# Patient Record
Sex: MALE | Race: WHITE | NOT HISPANIC OR LATINO | Employment: FULL TIME | URBAN - METROPOLITAN AREA
[De-identification: names, ages, dates, MRNs, and addresses within clinical notes are randomized per-mention and may not be internally consistent; named-entity substitution may affect disease eponyms.]

---

## 2019-08-08 ENCOUNTER — OFFICE VISIT (OUTPATIENT)
Dept: INTERNAL MEDICINE | Facility: CLINIC | Age: 29
End: 2019-08-08
Attending: FAMILY MEDICINE
Payer: COMMERCIAL

## 2019-08-08 ENCOUNTER — CLINICAL SUPPORT (OUTPATIENT)
Dept: INTERNAL MEDICINE | Facility: CLINIC | Age: 29
End: 2019-08-08
Payer: COMMERCIAL

## 2019-08-08 VITALS
DIASTOLIC BLOOD PRESSURE: 82 MMHG | HEART RATE: 70 BPM | SYSTOLIC BLOOD PRESSURE: 120 MMHG | BODY MASS INDEX: 21.97 KG/M2 | WEIGHT: 156.94 LBS | TEMPERATURE: 98 F | OXYGEN SATURATION: 98 % | HEIGHT: 71 IN

## 2019-08-08 DIAGNOSIS — G40.319 GENERALIZED CONVULSIVE EPILEPSY WITH INTRACTABLE EPILEPSY: ICD-10-CM

## 2019-08-08 DIAGNOSIS — Z00.00 ROUTINE GENERAL MEDICAL EXAMINATION AT A HEALTH CARE FACILITY: Primary | ICD-10-CM

## 2019-08-08 DIAGNOSIS — Z00.00 ANNUAL PHYSICAL EXAM: Primary | ICD-10-CM

## 2019-08-08 LAB
ALBUMIN SERPL BCP-MCNC: 4.2 G/DL (ref 3.5–5.2)
ALP SERPL-CCNC: 41 U/L (ref 55–135)
ALT SERPL W/O P-5'-P-CCNC: 24 U/L (ref 10–44)
ANION GAP SERPL CALC-SCNC: 7 MMOL/L (ref 8–16)
AST SERPL-CCNC: 18 U/L (ref 10–40)
BILIRUB SERPL-MCNC: 0.9 MG/DL (ref 0.1–1)
BUN SERPL-MCNC: 14 MG/DL (ref 6–20)
CALCIUM SERPL-MCNC: 9.6 MG/DL (ref 8.7–10.5)
CHLORIDE SERPL-SCNC: 100 MMOL/L (ref 95–110)
CHOLEST SERPL-MCNC: 160 MG/DL (ref 120–199)
CHOLEST/HDLC SERPL: 3.1 {RATIO} (ref 2–5)
CO2 SERPL-SCNC: 27 MMOL/L (ref 23–29)
CREAT SERPL-MCNC: 0.8 MG/DL (ref 0.5–1.4)
ERYTHROCYTE [DISTWIDTH] IN BLOOD BY AUTOMATED COUNT: 11.4 % (ref 11.5–14.5)
EST. GFR  (AFRICAN AMERICAN): >60 ML/MIN/1.73 M^2
EST. GFR  (NON AFRICAN AMERICAN): >60 ML/MIN/1.73 M^2
ESTIMATED AVG GLUCOSE: 88 MG/DL (ref 68–131)
GLUCOSE SERPL-MCNC: 94 MG/DL (ref 70–110)
HBA1C MFR BLD HPLC: 4.7 % (ref 4–5.6)
HCT VFR BLD AUTO: 45.3 % (ref 40–54)
HDLC SERPL-MCNC: 52 MG/DL (ref 40–75)
HDLC SERPL: 32.5 % (ref 20–50)
HGB BLD-MCNC: 16 G/DL (ref 14–18)
LDLC SERPL CALC-MCNC: 91.6 MG/DL (ref 63–159)
MCH RBC QN AUTO: 32.4 PG (ref 27–31)
MCHC RBC AUTO-ENTMCNC: 35.3 G/DL (ref 32–36)
MCV RBC AUTO: 92 FL (ref 82–98)
NONHDLC SERPL-MCNC: 108 MG/DL
PLATELET # BLD AUTO: 193 K/UL (ref 150–350)
PMV BLD AUTO: 10.2 FL (ref 9.2–12.9)
POTASSIUM SERPL-SCNC: 4 MMOL/L (ref 3.5–5.1)
PROT SERPL-MCNC: 7.1 G/DL (ref 6–8.4)
RBC # BLD AUTO: 4.94 M/UL (ref 4.6–6.2)
SODIUM SERPL-SCNC: 134 MMOL/L (ref 136–145)
TRIGL SERPL-MCNC: 82 MG/DL (ref 30–150)
WBC # BLD AUTO: 5.46 K/UL (ref 3.9–12.7)

## 2019-08-08 PROCEDURE — 80053 COMPREHEN METABOLIC PANEL: CPT

## 2019-08-08 PROCEDURE — 85027 COMPLETE CBC AUTOMATED: CPT

## 2019-08-08 PROCEDURE — 99999 PR PBB SHADOW E&M-NEW PATIENT-LVL III: CPT | Mod: PBBFAC,,, | Performed by: FAMILY MEDICINE

## 2019-08-08 PROCEDURE — 99203 OFFICE O/P NEW LOW 30 MIN: CPT | Mod: PBBFAC | Performed by: FAMILY MEDICINE

## 2019-08-08 PROCEDURE — 99999 PR PBB SHADOW E&M-NEW PATIENT-LVL III: ICD-10-PCS | Mod: PBBFAC,,, | Performed by: FAMILY MEDICINE

## 2019-08-08 PROCEDURE — 99385 PREV VISIT NEW AGE 18-39: CPT | Mod: S$PBB,,, | Performed by: FAMILY MEDICINE

## 2019-08-08 PROCEDURE — 80061 LIPID PANEL: CPT

## 2019-08-08 PROCEDURE — 83036 HEMOGLOBIN GLYCOSYLATED A1C: CPT

## 2019-08-08 PROCEDURE — 99385 PR PREVENTIVE VISIT,NEW,18-39: ICD-10-PCS | Mod: S$PBB,,, | Performed by: FAMILY MEDICINE

## 2019-08-08 NOTE — PROGRESS NOTES
Subjective:       Patient ID: Miguel Farmer is a 29 y.o. male.    Chief Complaint: Executive Health    New patient to establish care and annual wellness check, physical exam.  P, S, Fm, Soc Hx's; Meds, allergies reviewed and reconciled.  Health maintenance file reviewed and addressed items due.    Review of Systems   Constitutional: Negative for chills, fatigue, fever and unexpected weight change.   HENT: Negative for congestion and trouble swallowing.    Eyes: Negative for redness and visual disturbance.   Respiratory: Negative for cough, chest tightness and shortness of breath.    Cardiovascular: Negative for chest pain, palpitations and leg swelling.   Gastrointestinal: Negative for abdominal pain and blood in stool.   Genitourinary: Negative for difficulty urinating and hematuria.   Musculoskeletal: Negative for arthralgias, back pain, gait problem, joint swelling, myalgias and neck pain.   Skin: Negative for color change and rash.   Neurological: Negative for tremors, speech difficulty, weakness, numbness and headaches.   Hematological: Negative for adenopathy. Does not bruise/bleed easily.   Psychiatric/Behavioral: Negative for behavioral problems, confusion and sleep disturbance. The patient is not nervous/anxious.        Objective:      Physical Exam   Constitutional: He appears well-developed and well-nourished.   HENT:   Head: Normocephalic.   Right Ear: Tympanic membrane, external ear and ear canal normal.   Left Ear: Tympanic membrane, external ear and ear canal normal.   Mouth/Throat: Oropharynx is clear and moist.   Eyes: Pupils are equal, round, and reactive to light.   Neck: Normal range of motion. Neck supple. Carotid bruit is not present. No thyromegaly present.   Cardiovascular: Normal rate, regular rhythm, normal heart sounds and intact distal pulses. Exam reveals no gallop and no friction rub.   No murmur heard.  Pulmonary/Chest: Effort normal and breath sounds normal.   Abdominal: Soft. He  exhibits no distension and no mass. There is no tenderness. There is no rebound and no guarding.   Musculoskeletal: Normal range of motion. He exhibits no edema or tenderness.   Lymphadenopathy:     He has no cervical adenopathy.   Neurological: He is alert. He has normal strength. He displays normal reflexes. No cranial nerve deficit or sensory deficit. Coordination and gait normal.   Skin: Skin is warm and dry.   Psychiatric: He has a normal mood and affect. His behavior is normal. Judgment and thought content normal.   Nursing note and vitals reviewed.      Assessment:       1. Annual physical exam    2. Generalized convulsive epilepsy with intractable epilepsy        Plan:     Medication List with Changes/Refills   Current Medications    DIVALPROEX (DEPAKOTE) 500 MG EC TABLET    Take 2 tablets (1,000 mg total) by mouth once daily.   Discontinued Medications    HYDROCODONE-ACETAMINOPHEN (NORCO) 5-325 MG PER TABLET        OXYCODONE-ACETAMINOPHEN (PERCOCET) 7.5-325 MG PER TABLET        PENICILLIN V POTASSIUM (VEETID) 500 MG TABLET        TEMAZEPAM (RESTORIL) 15 MG CAPSULE         Miguel was seen today for [facility].    Diagnoses and all orders for this visit:    Annual physical exam    Generalized convulsive epilepsy with intractable epilepsy      See meds, orders, follow up, routing and instructions sections of encounter.  This is a new patient for [facility], [facility].  He has no acute complaints.  He is overdue for followup with his   neurologist.  He has had no recent seizures.      DANA/CHANTELLE  dd: 08/08/2019 12:27:00 (CDT)  td: 08/09/2019 05:03:38 (CDT)  Doc ID   #[id number]  Job ID #[id number]    CC:

## 2019-12-11 ENCOUNTER — OFFICE VISIT (OUTPATIENT)
Dept: OPTOMETRY | Facility: CLINIC | Age: 29
End: 2019-12-11

## 2019-12-11 ENCOUNTER — OFFICE VISIT (OUTPATIENT)
Dept: OPTOMETRY | Facility: CLINIC | Age: 29
End: 2019-12-11
Payer: COMMERCIAL

## 2019-12-11 DIAGNOSIS — H52.223 REGULAR ASTIGMATISM OF BOTH EYES: ICD-10-CM

## 2019-12-11 DIAGNOSIS — H52.13 MYOPIA, BILATERAL: Primary | ICD-10-CM

## 2019-12-11 DIAGNOSIS — Z04.9 DISEASE RULED OUT AFTER EXAMINATION: ICD-10-CM

## 2019-12-11 DIAGNOSIS — Z46.0 FITTING AND ADJUSTMENT OF SPECTACLES AND CONTACT LENSES: Primary | ICD-10-CM

## 2019-12-11 PROCEDURE — 99999 PR PBB SHADOW E&M-EST. PATIENT-LVL I: CPT | Mod: PBBFAC,,, | Performed by: OPTOMETRIST

## 2019-12-11 PROCEDURE — 92015 DETERMINE REFRACTIVE STATE: CPT | Mod: S$GLB,,, | Performed by: OPTOMETRIST

## 2019-12-11 PROCEDURE — 92310 PR CONTACT LENS FITTING (NO CHANGE): ICD-10-PCS | Mod: CSM,,, | Performed by: OPTOMETRIST

## 2019-12-11 PROCEDURE — 92015 PR REFRACTION: ICD-10-PCS | Mod: S$GLB,,, | Performed by: OPTOMETRIST

## 2019-12-11 PROCEDURE — 99999 PR PBB SHADOW E&M-EST. PATIENT-LVL I: ICD-10-PCS | Mod: PBBFAC,,, | Performed by: OPTOMETRIST

## 2019-12-11 PROCEDURE — 92004 PR EYE EXAM, NEW PATIENT,COMPREHESV: ICD-10-PCS | Mod: S$GLB,,, | Performed by: OPTOMETRIST

## 2019-12-11 PROCEDURE — 92004 COMPRE OPH EXAM NEW PT 1/>: CPT | Mod: S$GLB,,, | Performed by: OPTOMETRIST

## 2019-12-11 PROCEDURE — 99999 PR PBB SHADOW E&M-EST. PATIENT-LVL II: ICD-10-PCS | Mod: PBBFAC,,, | Performed by: OPTOMETRIST

## 2019-12-11 PROCEDURE — 99999 PR PBB SHADOW E&M-EST. PATIENT-LVL II: CPT | Mod: PBBFAC,,, | Performed by: OPTOMETRIST

## 2019-12-11 PROCEDURE — 92310 CONTACT LENS FITTING OU: CPT | Mod: CSM,,, | Performed by: OPTOMETRIST

## 2019-12-11 NOTE — PROGRESS NOTES
HPI     Pt here for annual and cl fit  Last exam was 2 yr ago  Wears air optix aqua   Patient denies diplopia, headaches, flashes/floaters, pain, and   itching/burning/tearing.    Pt does not use any eye drops.  No problems with va    Last edited by Rachel Lopez on 12/11/2019  2:14 PM. (History)            Assessment /Plan     For exam results, see Encounter Report.    Myopia, bilateral    Regular astigmatism of both eyes    Disease ruled out after examination      Rx specs  CL fit today: dispensed trials of air optix OU  Change power  Remove nightly, replace monthly  Ok to order if happy with vision and comfort OU    Good overall ocular health, monitor yearly

## 2020-04-21 DIAGNOSIS — Z01.84 ANTIBODY RESPONSE EXAMINATION: ICD-10-CM

## 2020-04-29 ENCOUNTER — LAB VISIT (OUTPATIENT)
Dept: LAB | Facility: HOSPITAL | Age: 30
End: 2020-04-29
Attending: INTERNAL MEDICINE
Payer: COMMERCIAL

## 2020-04-29 DIAGNOSIS — Z01.84 ANTIBODY RESPONSE EXAMINATION: ICD-10-CM

## 2020-04-29 LAB — SARS-COV-2 IGG SERPLBLD QL IA.RAPID: NEGATIVE

## 2020-04-29 PROCEDURE — 36415 COLL VENOUS BLD VENIPUNCTURE: CPT

## 2020-04-29 PROCEDURE — 86769 SARS-COV-2 COVID-19 ANTIBODY: CPT

## 2020-12-04 ENCOUNTER — OFFICE VISIT (OUTPATIENT)
Dept: OPTOMETRY | Facility: CLINIC | Age: 30
End: 2020-12-04
Payer: COMMERCIAL

## 2020-12-04 DIAGNOSIS — Z04.9 DISEASE RULED OUT AFTER EXAMINATION: ICD-10-CM

## 2020-12-04 DIAGNOSIS — H52.13 MYOPIA, BILATERAL: Primary | ICD-10-CM

## 2020-12-04 DIAGNOSIS — H52.223 REGULAR ASTIGMATISM OF BOTH EYES: ICD-10-CM

## 2020-12-04 PROCEDURE — 99999 PR PBB SHADOW E&M-EST. PATIENT-LVL II: ICD-10-PCS | Mod: PBBFAC,,, | Performed by: OPTOMETRIST

## 2020-12-04 PROCEDURE — 99999 PR PBB SHADOW E&M-EST. PATIENT-LVL II: CPT | Mod: PBBFAC,,, | Performed by: OPTOMETRIST

## 2020-12-04 PROCEDURE — 92015 DETERMINE REFRACTIVE STATE: CPT | Mod: S$GLB,,, | Performed by: OPTOMETRIST

## 2020-12-04 PROCEDURE — 92015 PR REFRACTION: ICD-10-PCS | Mod: S$GLB,,, | Performed by: OPTOMETRIST

## 2020-12-04 PROCEDURE — 92014 COMPRE OPH EXAM EST PT 1/>: CPT | Mod: S$GLB,,, | Performed by: OPTOMETRIST

## 2020-12-04 PROCEDURE — 92014 PR EYE EXAM, EST PATIENT,COMPREHESV: ICD-10-PCS | Mod: S$GLB,,, | Performed by: OPTOMETRIST

## 2020-12-04 NOTE — PROGRESS NOTES
HPI     Pt here for annual visit  No c/o about va  Happy with current specs  Feels like va with cls is not as good , but does not wish to pay for cl   fitting  Patient denies diplopia, headaches, flashes/floaters, pain, and   itching/burning/tearing.    Pt does not use any eye drops.      Last edited by Cyndee San, OD on 12/4/2020  1:40 PM. (History)            Assessment /Plan     For exam results, see Encounter Report.    Myopia, bilateral    Regular astigmatism of both eyes    Disease ruled out after examination    Rx specs  Good overall ocular health, monitor yearly    RTC 1 year, sooner PRN

## 2020-12-07 ENCOUNTER — LAB VISIT (OUTPATIENT)
Dept: URGENT CARE | Facility: CLINIC | Age: 30
End: 2020-12-07
Payer: COMMERCIAL

## 2020-12-07 ENCOUNTER — TELEPHONE (OUTPATIENT)
Dept: PRIMARY CARE CLINIC | Facility: OTHER | Age: 30
End: 2020-12-07

## 2020-12-07 DIAGNOSIS — R53.83 FATIGUE, UNSPECIFIED TYPE: ICD-10-CM

## 2020-12-07 DIAGNOSIS — J02.9 SORE THROAT: ICD-10-CM

## 2020-12-07 DIAGNOSIS — R68.83 CHILLS: ICD-10-CM

## 2020-12-07 DIAGNOSIS — R09.81 NASAL CONGESTION: Primary | ICD-10-CM

## 2020-12-07 DIAGNOSIS — U07.1 COVID-19 VIRUS DETECTED: ICD-10-CM

## 2020-12-07 DIAGNOSIS — R09.81 NASAL CONGESTION: ICD-10-CM

## 2020-12-07 LAB
CTP QC/QA: YES
SARS-COV-2 RDRP RESP QL NAA+PROBE: POSITIVE

## 2020-12-07 PROCEDURE — U0002 COVID-19 LAB TEST NON-CDC: HCPCS | Mod: QW,S$GLB,, | Performed by: INTERNAL MEDICINE

## 2020-12-07 PROCEDURE — U0002: ICD-10-PCS | Mod: QW,S$GLB,, | Performed by: INTERNAL MEDICINE

## 2020-12-07 NOTE — TELEPHONE ENCOUNTER
Called to discuss positive COVID-19 result from today, 12/7/2020 - patient states he was called by place he tested and told her was negative.  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 Voiced understanding of POSITIVE result, I will call him back shortly to review quarantine and provide with Trekea/Nomi contact info.

## 2020-12-07 NOTE — PATIENT INSTRUCTIONS
You have tested positive for COVID-19 today.  Please note that patients who test positive for COVID-19 are required by the CDC to undergo isolation for 10 days after their symptoms first began.  This isolation starts from the day you first developed symptoms, not the day of your positive test. For example, if your symptoms began on a Monday but tested positive on the following Wednesday, your 10-day isolation begins from that Monday, not the Wednesday you tested positive.  However, if you are asymptomatic (a person who does not have any symptoms) and COVID-19 positive, your 10-day isolation begins on the day you tested positive, regardless of exposure date.  Also, per the CDC guidelines, once your 10 days have passed, and you have not had fever greater than 100.4F in the last 24 hours without taking any fever reducers such as Tylenol (Acetaminophen) or Motrin (Ibuprofen), you may return to your normal activities including social distancing, wearing masks, and frequent handwashing - YOU DO NOT NEED ANOTHER TEST IN ORDER TO END YOUR QUARANTINE.

## 2020-12-07 NOTE — TELEPHONE ENCOUNTER
Call #2 Spoke to patient, symptom onset 12/4/2020. Unknown exposure. All questions were answered.

## 2021-03-05 ENCOUNTER — IMMUNIZATION (OUTPATIENT)
Dept: INTERNAL MEDICINE | Facility: CLINIC | Age: 31
End: 2021-03-05
Payer: COMMERCIAL

## 2021-03-05 DIAGNOSIS — Z23 NEED FOR VACCINATION: Primary | ICD-10-CM

## 2021-03-05 PROCEDURE — 91300 COVID-19, MRNA, LNP-S, PF, 30 MCG/0.3 ML DOSE VACCINE: CPT | Mod: PBBFAC | Performed by: INTERNAL MEDICINE

## 2021-03-26 ENCOUNTER — IMMUNIZATION (OUTPATIENT)
Dept: INTERNAL MEDICINE | Facility: CLINIC | Age: 31
End: 2021-03-26
Payer: COMMERCIAL

## 2021-03-26 DIAGNOSIS — Z23 NEED FOR VACCINATION: Primary | ICD-10-CM

## 2021-03-26 PROCEDURE — 0002A COVID-19, MRNA, LNP-S, PF, 30 MCG/0.3 ML DOSE VACCINE: CPT | Mod: PBBFAC | Performed by: INTERNAL MEDICINE

## 2021-03-26 PROCEDURE — 91300 COVID-19, MRNA, LNP-S, PF, 30 MCG/0.3 ML DOSE VACCINE: CPT | Mod: PBBFAC | Performed by: INTERNAL MEDICINE

## 2022-03-29 ENCOUNTER — PATIENT MESSAGE (OUTPATIENT)
Dept: RESEARCH | Facility: HOSPITAL | Age: 32
End: 2022-03-29
Payer: COMMERCIAL

## 2022-11-01 ENCOUNTER — IMMUNIZATION (OUTPATIENT)
Dept: PHARMACY | Facility: CLINIC | Age: 32
End: 2022-11-01
Payer: COMMERCIAL

## 2023-05-11 ENCOUNTER — OFFICE VISIT (OUTPATIENT)
Dept: PRIMARY CARE CLINIC | Facility: CLINIC | Age: 33
End: 2023-05-11
Payer: COMMERCIAL

## 2023-05-11 VITALS
HEART RATE: 69 BPM | RESPIRATION RATE: 18 BRPM | DIASTOLIC BLOOD PRESSURE: 70 MMHG | SYSTOLIC BLOOD PRESSURE: 114 MMHG | WEIGHT: 140.63 LBS | OXYGEN SATURATION: 99 % | HEIGHT: 71 IN | TEMPERATURE: 98 F | BODY MASS INDEX: 19.69 KG/M2

## 2023-05-11 DIAGNOSIS — Z76.89 ENCOUNTER TO ESTABLISH CARE: Primary | ICD-10-CM

## 2023-05-11 DIAGNOSIS — Z11.3 ROUTINE SCREENING FOR STI (SEXUALLY TRANSMITTED INFECTION): ICD-10-CM

## 2023-05-11 DIAGNOSIS — Z11.4 ENCOUNTER FOR SCREENING FOR HIV: ICD-10-CM

## 2023-05-11 DIAGNOSIS — Z11.59 NEED FOR HEPATITIS C SCREENING TEST: ICD-10-CM

## 2023-05-11 PROCEDURE — 3074F PR MOST RECENT SYSTOLIC BLOOD PRESSURE < 130 MM HG: ICD-10-PCS | Mod: CPTII,S$GLB,, | Performed by: STUDENT IN AN ORGANIZED HEALTH CARE EDUCATION/TRAINING PROGRAM

## 2023-05-11 PROCEDURE — 99999 PR PBB SHADOW E&M-EST. PATIENT-LVL III: CPT | Mod: PBBFAC,,, | Performed by: STUDENT IN AN ORGANIZED HEALTH CARE EDUCATION/TRAINING PROGRAM

## 2023-05-11 PROCEDURE — 3078F PR MOST RECENT DIASTOLIC BLOOD PRESSURE < 80 MM HG: ICD-10-PCS | Mod: CPTII,S$GLB,, | Performed by: STUDENT IN AN ORGANIZED HEALTH CARE EDUCATION/TRAINING PROGRAM

## 2023-05-11 PROCEDURE — 1160F RVW MEDS BY RX/DR IN RCRD: CPT | Mod: CPTII,S$GLB,, | Performed by: STUDENT IN AN ORGANIZED HEALTH CARE EDUCATION/TRAINING PROGRAM

## 2023-05-11 PROCEDURE — 3008F PR BODY MASS INDEX (BMI) DOCUMENTED: ICD-10-PCS | Mod: CPTII,S$GLB,, | Performed by: STUDENT IN AN ORGANIZED HEALTH CARE EDUCATION/TRAINING PROGRAM

## 2023-05-11 PROCEDURE — 1160F PR REVIEW ALL MEDS BY PRESCRIBER/CLIN PHARMACIST DOCUMENTED: ICD-10-PCS | Mod: CPTII,S$GLB,, | Performed by: STUDENT IN AN ORGANIZED HEALTH CARE EDUCATION/TRAINING PROGRAM

## 2023-05-11 PROCEDURE — 3078F DIAST BP <80 MM HG: CPT | Mod: CPTII,S$GLB,, | Performed by: STUDENT IN AN ORGANIZED HEALTH CARE EDUCATION/TRAINING PROGRAM

## 2023-05-11 PROCEDURE — 99999 PR PBB SHADOW E&M-EST. PATIENT-LVL III: ICD-10-PCS | Mod: PBBFAC,,, | Performed by: STUDENT IN AN ORGANIZED HEALTH CARE EDUCATION/TRAINING PROGRAM

## 2023-05-11 PROCEDURE — 99214 PR OFFICE/OUTPT VISIT, EST, LEVL IV, 30-39 MIN: ICD-10-PCS | Mod: S$GLB,,, | Performed by: STUDENT IN AN ORGANIZED HEALTH CARE EDUCATION/TRAINING PROGRAM

## 2023-05-11 PROCEDURE — 3008F BODY MASS INDEX DOCD: CPT | Mod: CPTII,S$GLB,, | Performed by: STUDENT IN AN ORGANIZED HEALTH CARE EDUCATION/TRAINING PROGRAM

## 2023-05-11 PROCEDURE — 1159F MED LIST DOCD IN RCRD: CPT | Mod: CPTII,S$GLB,, | Performed by: STUDENT IN AN ORGANIZED HEALTH CARE EDUCATION/TRAINING PROGRAM

## 2023-05-11 PROCEDURE — 3074F SYST BP LT 130 MM HG: CPT | Mod: CPTII,S$GLB,, | Performed by: STUDENT IN AN ORGANIZED HEALTH CARE EDUCATION/TRAINING PROGRAM

## 2023-05-11 PROCEDURE — 99214 OFFICE O/P EST MOD 30 MIN: CPT | Mod: S$GLB,,, | Performed by: STUDENT IN AN ORGANIZED HEALTH CARE EDUCATION/TRAINING PROGRAM

## 2023-05-11 PROCEDURE — 1159F PR MEDICATION LIST DOCUMENTED IN MEDICAL RECORD: ICD-10-PCS | Mod: CPTII,S$GLB,, | Performed by: STUDENT IN AN ORGANIZED HEALTH CARE EDUCATION/TRAINING PROGRAM

## 2023-05-11 NOTE — PROGRESS NOTES
"Subjective:       Patient ID: Miguel Farmer is a 32 y.o. male.    Chief Complaint: Establish Care      HPI:  32 y.o. male presents to [facility] to establish care      Acute concerns?: Patient reports unprotected sex 1 month ag o and would like STI screening at this time.    Taking Depakote hasn't seen Neurology in some time.    Last PCP?: None  Allergies:  NKDA  Medical History: Seizures  Medications: depakote 500 mg bid  Surgical History: None  Family History: No known cancers; no known autoimmune disease  Social History: Never smoker, EtOH on off week 1/2 days, no illicits    Hep C/HIV screening?: Amenable  Last tetanus vaccine?: Unknown, 2016      Review of Systems   Constitutional:  Negative for chills, diaphoresis, fatigue and fever.   HENT:  Negative for sinus pressure.    Respiratory:  Negative for chest tightness and shortness of breath.    Cardiovascular:  Negative for chest pain and palpitations.   Gastrointestinal:  Negative for abdominal pain, diarrhea, nausea and vomiting.   Genitourinary:  Negative for genital sores, penile discharge and testicular pain.   Skin:  Negative for rash and wound.     Objective:      Vitals:    05/11/23 1124   BP: 114/70   BP Location: Left arm   Patient Position: Sitting   BP Method: Medium (Manual)   Pulse: 69   Resp: 18   Temp: 97.6 °F (36.4 °C)   TempSrc: Temporal   SpO2: 99%   Weight: 63.8 kg (140 lb 10.5 oz)   Height: 5' 11" (1.803 m)     Physical Exam  Vitals reviewed.   Constitutional:       General: He is not in acute distress.     Appearance: Normal appearance. He is not ill-appearing.   HENT:      Head: Normocephalic and atraumatic.   Eyes:      General:         Right eye: No discharge.         Left eye: No discharge.      Conjunctiva/sclera: Conjunctivae normal.   Cardiovascular:      Rate and Rhythm: Normal rate and regular rhythm.      Pulses: Normal pulses.      Heart sounds: No murmur heard.  Pulmonary:      Effort: Pulmonary effort is normal.      Breath " sounds: Normal breath sounds.   Abdominal:      Palpations: Abdomen is soft.      Tenderness: There is no abdominal tenderness.   Musculoskeletal:         General: No deformity.      Cervical back: Neck supple. No rigidity.   Lymphadenopathy:      Cervical: No cervical adenopathy.   Skin:     General: Skin is warm and dry.      Coloration: Skin is not jaundiced.   Neurological:      General: No focal deficit present.      Mental Status: He is alert and oriented to person, place, and time.   Psychiatric:         Mood and Affect: Mood normal.         Behavior: Behavior normal.           Lab Results   Component Value Date     (L) 08/08/2019    K 4.0 08/08/2019     08/08/2019    CO2 27 08/08/2019    BUN 14 08/08/2019    CREATININE 0.8 08/08/2019    ANIONGAP 7 (L) 08/08/2019     Lab Results   Component Value Date    HGBA1C 4.7 08/08/2019     No results found for: BNP, BNPTRIAGEBLO    Lab Results   Component Value Date    WBC 5.46 08/08/2019    HGB 16.0 08/08/2019    HCT 45.3 08/08/2019     08/08/2019     Lab Results   Component Value Date    CHOL 160 08/08/2019    HDL 52 08/08/2019    LDLCALC 91.6 08/08/2019    TRIG 82 08/08/2019          Current Outpatient Medications:     divalproex ER (DEPAKOTE) 500 MG Tb24, Take 1 tablet by mouth twice daily, Disp: 60 tablet, Rfl: 5        Assessment:       1. Encounter to establish care    2. Routine screening for STI (sexually transmitted infection)    3. Need for hepatitis C screening test    4. Encounter for screening for HIV           Plan:       Encounter to establish care    Routine screening for STI (sexually transmitted infection)  -     RPR; Future; Expected date: 05/11/2023  -     HIV 1/2 Ag/Ab (4th Gen); Future; Expected date: 05/11/2023  -     Hepatitis B Surface Ab, Qualitative; Future; Expected date: 05/11/2023  -     Hepatitis C Antibody; Future; Expected date: 05/11/2023  -     C. trachomatis/N. gonorrhoeae by AMP DNA [facility]; Urine; Future;  Expected date: 05/11/2023  - No known exposure or outward Sx at this time    Need for hepatitis C screening test  -     Hepatitis C Antibody; Future; Expected date: 05/11/2023    Encounter for screening for HIV  -     HIV 1/2 Ag/Ab (4th Gen); Future; Expected date: 05/11/2023    RTC in 1 year

## 2023-09-18 ENCOUNTER — PATIENT MESSAGE (OUTPATIENT)
Dept: PRIMARY CARE CLINIC | Facility: CLINIC | Age: 33
End: 2023-09-18
Payer: COMMERCIAL

## 2023-10-18 ENCOUNTER — PATIENT MESSAGE (OUTPATIENT)
Dept: CARDIOLOGY | Facility: CLINIC | Age: 33
End: 2023-10-18
Payer: COMMERCIAL

## 2024-09-19 ENCOUNTER — PATIENT MESSAGE (OUTPATIENT)
Dept: PRIMARY CARE CLINIC | Facility: CLINIC | Age: 34
End: 2024-09-19
Payer: COMMERCIAL

## 2025-02-05 ENCOUNTER — OFFICE VISIT (OUTPATIENT)
Dept: URGENT CARE | Facility: CLINIC | Age: 35
End: 2025-02-05
Payer: COMMERCIAL

## 2025-02-05 VITALS
BODY MASS INDEX: 26.67 KG/M2 | TEMPERATURE: 98 F | HEART RATE: 89 BPM | DIASTOLIC BLOOD PRESSURE: 68 MMHG | RESPIRATION RATE: 17 BRPM | HEIGHT: 71 IN | WEIGHT: 190.5 LBS | OXYGEN SATURATION: 98 % | SYSTOLIC BLOOD PRESSURE: 106 MMHG

## 2025-02-05 DIAGNOSIS — M54.6 ACUTE MIDLINE THORACIC BACK PAIN: Primary | ICD-10-CM

## 2025-02-05 PROCEDURE — 71100 X-RAY EXAM RIBS UNI 2 VIEWS: CPT | Mod: LT,S$GLB,, | Performed by: RADIOLOGY

## 2025-02-05 PROCEDURE — 99203 OFFICE O/P NEW LOW 30 MIN: CPT | Mod: S$GLB,,,

## 2025-02-05 PROCEDURE — 72070 X-RAY EXAM THORAC SPINE 2VWS: CPT | Mod: S$GLB,,, | Performed by: RADIOLOGY

## 2025-02-05 NOTE — PROGRESS NOTES
"Subjective:      Patient ID: Miguel Farmer is a 34 y.o. male.    Vitals:  height is 5' 11" (1.803 m) and weight is 86.4 kg (190 lb 7.6 oz). His oral temperature is 97.6 °F (36.4 °C). His blood pressure is 106/68 and his pulse is 89. His respiration is 17 and oxygen saturation is 98%.     Chief Complaint: Back Pain    Pt presents today with mid back pain ; onset approx 4x days ago. Patient reports he has epilepsy and suspects he had a seizure  4x days ago and has no recall of the episode- was at home. States hasn't had an episode occur in seven years Patient complaining pain in back when changing position and has bite marks on tongue. denies loss of sensation , vision change , chest pain , sob , edema, blood loss and emesis. Pt tried tylenol and ibuprofen;mild relief.     Back Pain  This is a new problem. The current episode started in the past 7 days. The problem occurs constantly. The problem is unchanged. The pain is present in the costovertebral angle. The quality of the pain is described as aching and shooting. The pain does not radiate. The pain is at a severity of 7/10. The pain is moderate. The pain is The same all the time. The symptoms are aggravated by bending, position, lying down, sitting and standing. Stiffness is present All day. Pertinent negatives include no abdominal pain, bladder incontinence, bowel incontinence, chest pain, dysuria, fever, headaches, leg pain, numbness, paresis, paresthesias, pelvic pain, perianal numbness, tingling, weakness or weight loss. Treatments tried: tylenol and ibuprofen. The treatment provided mild relief.       Constitution: Negative for fever.   Cardiovascular:  Negative for chest pain.   Gastrointestinal:  Negative for abdominal pain and bowel incontinence.   Genitourinary:  Negative for dysuria, bladder incontinence and pelvic pain.   Musculoskeletal:  Positive for back pain.   Neurological:  Negative for headaches and numbness.      Objective:     Physical " Exam   Constitutional: He is oriented to person, place, and time.  Non-toxic appearance.   HENT:   Head: Normocephalic.   Ears:   Right Ear: External ear normal.   Left Ear: External ear normal.   Nose: Nose normal.   Mouth/Throat: Mucous membranes are moist.   Eyes: Conjunctivae are normal. Pupils are equal, round, and reactive to light.   Cardiovascular: Normal rate, regular rhythm and normal heart sounds.   Pulmonary/Chest: Breath sounds normal. No stridor. No respiratory distress. He has no wheezes. He has no rhonchi. He has no rales. He exhibits no tenderness.         Comments: Tenderness left ribs    Abdominal: Soft.   Musculoskeletal:      Thoracic back: He exhibits decreased range of motion, tenderness and bony tenderness.   Neurological: He is alert and oriented to person, place, and time.   Skin: Skin is warm, dry and not diaphoretic.   Psychiatric: His behavior is normal. Mood, judgment and thought content normal.     XR THORACIC SPINE AP LATERAL    Result Date: 2/5/2025  EXAMINATION: XR THORACIC SPINE AP LATERAL CLINICAL HISTORY: Pain in thoracic spine TECHNIQUE: AP and lateral views of the thoracic spine were performed. COMPARISON: Left rib views 14:16 hours same day FINDINGS: Cutaneous metal marker left posterior 10th intercostal space, T12-L1 level.  Partial rotation frontal view right, minor levoscoliosis mid dorsal spine, mild kyphosis upper dorsal spine, T4-T8 anterior wedge deformity most prominent at T6 moderate degree with remote appearance.  Minimal mild anterior spondylosis.     Remote appearing anterior wedge deformity T4-T8 vertebral bodies.  No subluxation or paraspinal soft tissue mass.  Additional findings above. This report was flagged in Epic as abnormal. Electronically signed by: Soto Diaz MD Date:    02/05/2025 Time:    15:51    X-Ray Ribs 2 View Left    Result Date: 2/5/2025  EXAMINATION: XR RIBS 2 VIEW LEFT CLINICAL HISTORY: Pain in thoracic spine TECHNIQUE: Two views of the  left ribs were performed. COMPARISON: None. FINDINGS: No definite acute rib fractures identified     See above Electronically signed by: Ken Barrett MD Date:    02/05/2025 Time:    14:46     Assessment:     1. Acute midline thoracic back pain        Plan:       Acute midline thoracic back pain  -     XR THORACIC SPINE AP LATERAL; Future; Expected date: 02/05/2025  -     X-Ray Ribs 2 View Left; Future; Expected date: 02/05/2025  -     Ambulatory referral/consult to [facility]  -     Ambulatory referral/consult to Orthopedics        Discussed injection in clinic and patient declined at this time. After results from xray images, patient able to make appointments to be seen tomorrow for follow up.      Patient Instructions   - You must understand that you have received an Urgent Care treatment only and that you may be released before all of your medical problems are known or treated.   - You, the patient, will arrange for follow up care as instructed.   - If your condition worsens or fails to improve we recommend that you receive another evaluation at the ER immediately or contact your PCP to discuss your concerns or return here.

## 2025-02-06 ENCOUNTER — OFFICE VISIT (OUTPATIENT)
Dept: NEUROSURGERY | Facility: CLINIC | Age: 35
End: 2025-02-06
Payer: COMMERCIAL

## 2025-02-06 VITALS
HEART RATE: 91 BPM | DIASTOLIC BLOOD PRESSURE: 83 MMHG | WEIGHT: 190.5 LBS | SYSTOLIC BLOOD PRESSURE: 143 MMHG | TEMPERATURE: 99 F | BODY MASS INDEX: 26.57 KG/M2

## 2025-02-06 DIAGNOSIS — M54.6 PAIN IN THORACIC SPINE: ICD-10-CM

## 2025-02-06 DIAGNOSIS — G40.319 GENERALIZED CONVULSIVE EPILEPSY WITH INTRACTABLE EPILEPSY: Primary | ICD-10-CM

## 2025-02-06 DIAGNOSIS — S22.000A COMPRESSION FRACTURE OF BODY OF THORACIC VERTEBRA: ICD-10-CM

## 2025-02-06 PROCEDURE — 3008F BODY MASS INDEX DOCD: CPT | Mod: CPTII,S$GLB,, | Performed by: NURSE PRACTITIONER

## 2025-02-06 PROCEDURE — 3079F DIAST BP 80-89 MM HG: CPT | Mod: CPTII,S$GLB,, | Performed by: NURSE PRACTITIONER

## 2025-02-06 PROCEDURE — 3077F SYST BP >= 140 MM HG: CPT | Mod: CPTII,S$GLB,, | Performed by: NURSE PRACTITIONER

## 2025-02-06 PROCEDURE — 1160F RVW MEDS BY RX/DR IN RCRD: CPT | Mod: CPTII,S$GLB,, | Performed by: NURSE PRACTITIONER

## 2025-02-06 PROCEDURE — 1159F MED LIST DOCD IN RCRD: CPT | Mod: CPTII,S$GLB,, | Performed by: NURSE PRACTITIONER

## 2025-02-06 PROCEDURE — 99999 PR PBB SHADOW E&M-EST. PATIENT-LVL IV: CPT | Mod: PBBFAC,,, | Performed by: NURSE PRACTITIONER

## 2025-02-06 PROCEDURE — 99204 OFFICE O/P NEW MOD 45 MIN: CPT | Mod: S$GLB,,, | Performed by: NURSE PRACTITIONER

## 2025-02-06 NOTE — PROGRESS NOTES
DATE: 2/12/2025  PATIENT: Miguel Farmer    Supervising Physician: Trung Aguilar M.D.    CHIEF COMPLAINT: compression fracture follow up    HISTORY:  Miguel Farmer is a 34 y.o. male  here for initial evaluation of mid back pain (Back - 3). The pain has been present for since 2/1/25. Pt has a hx of juvenile epilepsy and says his last seizure was 10 years ago. However, on 2/1/25 he suspects he had a seizure because he he had a memory lapse of a period of time and noticed back pain and bit marks on his tongue. He was seen in UC on 2/5/25 and xrays showed thoracic VCFs. Pt saw NSGY on 2/6/25 and they ordered an MRI. The patient describes the pain as stabbing.  The pain is worse with activity and improved by rest. There is negative associated numbness and tingling. There is negative subjective weakness. Prior treatments have included no previous back problems in the past, no PT, ESIs, surgery. He is currently wearing a TLSO. Pt says he has noticed that since the event his posture is different. Pt says the pain is tolerable but he is worried about long term consequences of the fractures    The patient denies myelopathic symptoms such as handwriting changes or difficulty with buttons/coins/keys. Denies perineal paresthesias, bowel/bladder dysfunction.    PAST MEDICAL/SURGICAL HISTORY:  Past Medical History:   Diagnosis Date    Seizures      No past surgical history on file.    Current Medications:   Current Outpatient Medications:     divalproex ER (DEPAKOTE ER) 500 MG Tb24 24 hr tablet, Take 1 tablet by mouth twice daily, Disp: 180 tablet, Rfl: 3    Social History:   Social History     Socioeconomic History    Marital status: Single   Tobacco Use    Smoking status: Never    Smokeless tobacco: Never   Substance and Sexual Activity    Alcohol use: Yes     Alcohol/week: 2.5 standard drinks of alcohol     Types: 3 drink(s) per week       REVIEW OF SYSTEMS:  Constitution: Negative. Negative for chills, fever and night  sweats.   Cardiovascular: Negative for chest pain and syncope.   Respiratory: Negative for cough and shortness of breath.   Gastrointestinal: See HPI. Negative for nausea/vomiting. Negative for abdominal pain.  Genitourinary: See HPI. Negative for discoloration or dysuria.  Skin: Negative for dry skin, itching and rash.   Hematologic/Lymphatic: Negative for bleeding problem. Does not bruise/bleed easily.   Musculoskeletal: Negative for falls and muscle weakness.   Neurological: See HPI. No seizures.   Endocrine: Negative for polydipsia, polyphagia and polyuria.   Allergic/Immunologic: Negative for hives and persistent infections.    PHYSICAL EXAMINATION:    There were no vitals taken for this visit.    General: The patient is a very pleasant 34 y.o. male in no apparent distress, the patient is oriented to person, place and time.   Psych: Normal mood and affect  HEENT: Vision grossly intact, hearing intact to the spoken word.  Lungs: Respirations unlabored.  Gait: Normal station and gait, no difficulty with toe or heel walk.   Skin: Dorsal lumbar skin negative for rashes, lesions, hairy patches and surgical scars.  Range of motion: Lumbar range of motion is acceptable. There is mild midline thoracic tenderness to palpation.  Spinal Balance: Global saggital and coronal spinal balance acceptable, no significant for scoliosis and kyphosis.  Musculoskeletal: No pain with the range of motion of the bilateral hips. No trochanteric tenderness to palpation.  Vascular: Bilateral lower extremities warm and well perfused, Dorsalis pedis pulses 2+ bilaterally.  Neurological: Normal strength and tone in all major motor groups in the bilateral lower extremities. Normal sensation to light touch in the L2-S1 dermatomes bilaterally.  Deep tendon reflexes symmetric 2+ in the bilateral lower extremities.  Negative Babinski bilaterally.  Straight leg raise negative bilaterally.     IMAGING:   Today I personally reviewed AP, Lat scoli  films demonstrate anterior wedge deformities T4 through T8 vertebral segments.     MRI thoracic demonstrates multiple compression fractures of the thoracic spine with mild height loss at T3, T7, T8 and T9 and moderate height loss at T5 and T6. Mild marrow edema noted within all of the involved vertebral bodies suggesting acute to subacute time course. No retropulsion or spinal stenosis    DXA scan shows L1 to L4 vertebral bone mineral density is equal to 0.743 g/cm squared with a T score of -3.2.       There is no height or weight on file to calculate BMI.    Hemoglobin A1C   Date Value Ref Range Status   08/08/2019 4.7 4.0 - 5.6 % Final     Comment:     ADA Screening Guidelines:  5.7-6.4%  Consistent with prediabetes  >or=6.5%  Consistent with diabetes  High levels of fetal hemoglobin interfere with the HbA1C  assay. Heterozygous hemoglobin variants (HbS, HgC, etc)do  not significantly interfere with this assay.   However, presence of multiple variants may affect accuracy.           ASSESSMENT/PLAN:    There are no diagnoses linked to this encounter.    Today we discussed at length all of the different treatment options including anti-inflammatories, acetaminophen, rest, ice, heat, physical therapy including strengthening and stretching exercises, home exercises, ROM, aerobic conditioning, aqua therapy, other modalities including ultrasound, massage, and dry needling, epidural steroid injections and finally surgical intervention.      Pt presents with acute thoracic VCFs without spinal stenosis. Reviewed images with Dr. Aguilar. Will treat conservatively with TLSO for comfort. Will start PT in a few weeks. Will refer to endocrine regarding osteoporosis. Will schedule repeat thoracic xrays in a month. Pt will fu sooner if pain worsens

## 2025-02-06 NOTE — PATIENT INSTRUCTIONS
- You must understand that you have received an Urgent Care treatment only and that you may be released before all of your medical problems are known or treated.   - You, the patient, will arrange for follow up care as instructed.   - If your condition worsens or fails to improve we recommend that you receive another evaluation at the ER immediately or contact your PCP to discuss your concerns or return here.

## 2025-02-06 NOTE — PROGRESS NOTES
"  Neurosurgery  History & Physical    SUBJECTIVE:       History of Present Illness: Miguel Farmer is a 34 y.o. male being seen in clinic today to follow-up on his recent urgent care visit from 2/5/25 where he was diagnosed T4-T8 anterior wedge deformity most prominent at T6 after sustaining what sounds to be a seizure. He has a hx of Juvenile myoclonic epilepsy. States that he has not had a seizure since 2011 and has not been taken his Depakote consistently as neurology felt like he had "grown out" of the seizure disorder given his normal EEGs. He does not clearly recall his recent episode as he was found on the couch with bite marks on tongue. Reports persistent upper back and abdominal distention since the episode. Describes the pain as constant and stabbing midline around T4-8.  Aggravating factors include standing, bending, and twisting. Alleviating factors include Tylenol and Advil. Denies weakness, numbness or tingling, b/b dysfunction, saddle anesthesia, or gait instability.      Review of patient's allergies indicates:  No Known Allergies    Current Outpatient Medications   Medication Sig Dispense Refill    divalproex 500 MG Tb24 Take 1 tablet (500 mg total) by mouth 2 (two) times daily. 180 tablet 5    divalproex ER (DEPAKOTE) 500 MG Tb24 Take 1 tablet by mouth twice daily 60 tablet 5     No current facility-administered medications for this visit.       Past Medical History:   Diagnosis Date    Seizures      No past surgical history on file.  Family History       Problem Relation (Age of Onset)    Cancer Maternal Grandmother    Diabetes Paternal Grandmother    Parkinsonism Maternal Grandfather    Seizures Cousin (7)          Social History     Socioeconomic History    Marital status: Single   Tobacco Use    Smoking status: Never    Smokeless tobacco: Never   Substance and Sexual Activity    Alcohol use: Yes     Alcohol/week: 2.5 standard drinks of alcohol     Types: 3 drink(s) per week       Review " of Systems    OBJECTIVE:     Vital Signs  Temp: 98.6 °F (37 °C)  Pulse: 91  BP: (!) 143/83  Pain Score: 0-No pain  Weight: 86.4 kg (190 lb 7.6 oz)  Body mass index is 26.57 kg/m².      Neurosurgery Physical Exam  General: well developed, well nourished, no distress.   Head: normocephalic, atraumatic  Neurologic: Alert and oriented. Thought content appropriate.  GCS: Motor: 6/Verbal: 5/Eyes: 4 GCS Total: 15  Mental Status: Awake, Alert, Oriented x 4  Language: No aphasia  Speech: No dysarthria  Cranial nerves: face symmetric, tongue midline, CN II-XII grossly intact.   Eyes: pupils equal, round  Pulmonary: normal respirations, no signs of respiratory distress  Abdomen: soft, non-distended  Skin: Skin is warm, dry and intact.  Sensory: intact to light touch throughout  Motor Strength:Moves all extremities spontaneously with good tone.  Full strength upper and lower extremities. No abnormal movements seen.     Reflexes:   Moise's: Negative.     Cerebellar:   Gait stable, fluid.   Tandem Gait: No difficulty  Able to walk on heels & toes     Thoracic:  Midline TTP: Positive around T4-8    Diagnostic Results:  I have personally reviewed the x-ray thoracic spine dated 2/5/25 as mentioned in the HPI.     ASSESSMENT/PLAN:     Miguel Farmer is a 34 y.o. male seen in clinic today to follow-up on his recent urgent care visit from 2/5/25 where he was diagnosed T4-T8 anterior wedge deformity most prominent at T6 after sustaining what sounds to be a seizure. He has a hx of Juvenile myoclonic epilepsy. He does not clearly recall his recent episode as he was found on the couch with bite marks on tongue. I have ordered:    - MRI thoracic spine to evaluate the chronicity of the fractures and for any stenosis.   -TLSO brace when OOB  - Referral to neurology to establish for epilepsy  - Scoliosis x-ray   - Repeat x-ray thoracic xray in 6 weeks  - Consider CT thoracic based on MRI findings    I would like the patient to follow-up  in clinic in 6 weeks. I have encouraged him to contact the clinic with any questions, concerns, or adverse clinical changes. He verbalized understanding.      JEANNETTE Blue-PIETER  Neurosurgery  [facility] [place].      Note dictated with voice recognition software, please excuse any grammatical errors.

## 2025-02-07 ENCOUNTER — TELEPHONE (OUTPATIENT)
Dept: NEUROLOGY | Facility: CLINIC | Age: 35
End: 2025-02-07
Payer: COMMERCIAL

## 2025-02-07 ENCOUNTER — HOSPITAL ENCOUNTER (OUTPATIENT)
Dept: RADIOLOGY | Facility: HOSPITAL | Age: 35
Discharge: HOME | End: 2025-02-07
Attending: NURSE PRACTITIONER
Payer: COMMERCIAL

## 2025-02-07 DIAGNOSIS — S22.000A COMPRESSION FRACTURE OF BODY OF THORACIC VERTEBRA: ICD-10-CM

## 2025-02-07 DIAGNOSIS — M54.6 PAIN IN THORACIC SPINE: ICD-10-CM

## 2025-02-07 PROCEDURE — 72082 X-RAY EXAM ENTIRE SPI 2/3 VW: CPT | Mod: 26,,, | Performed by: RADIOLOGY

## 2025-02-07 PROCEDURE — 72082 X-RAY EXAM ENTIRE SPI 2/3 VW: CPT | Mod: TC

## 2025-02-07 NOTE — TELEPHONE ENCOUNTER
Spoke with the patient. Offered next available appointment 2/17 at 8:20 am. Pt declined next available. 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 He is off now until 2/13 if we can manage something next week before Thursday. Will f/u with team and return pt's call to schedule.

## 2025-02-07 NOTE — TELEPHONE ENCOUNTER
"----- Message from Willie Hampton MD sent at 2/7/2025 10:56 AM CST -----  Regarding: RE: Epilepsy Appointment  Kristel,  We can add him on next week.  CV  ----- Message -----  From: Eli Wisdom RN  Sent: 2/7/2025   9:27 AM CST  To: Aniyah Jacobs NP; Willie Hampton MD; #  Subject: RE: Epilepsy Appointment                         Good morning, Dr. Hampton,    Can we fit this [facility] physician in with you sometime soon?    (I know the dept is already struggling and next week is Dr. BELL's last week before leave.)    Thanks,   Eli  ----- Message -----  From: Aniyah Jacobs NP  Sent: 2/6/2025   2:47 PM CST  To: Eli Wisdom RN  Subject: Epilepsy Appointment                             Good Afternoon,    I saw this sweet [facility] medicine physician who had a seizure 2 days ago. He has a hx of juvenile seizures and was told he "outgrew" them and no longer needed his medication. He has since restarted his medication but needs to establish with [facility]. Can you please help with an appointment?    Thank you,  Aniyah"

## 2025-02-10 ENCOUNTER — TELEPHONE (OUTPATIENT)
Dept: NEUROLOGY | Facility: CLINIC | Age: 35
End: 2025-02-10
Payer: COMMERCIAL

## 2025-02-10 NOTE — TELEPHONE ENCOUNTER
Provider has graciously agreed to add pt to clinic schedule on a day when he is on hospital service.   Spoke with the patient. Offered next available appointment tomorrow or Wednesday at 1. Pt accepted next available 2/11/25 at 1 pm with Dr Willie Hampton, verbalized understanding, and repeated back date/time/location of scheduled appointment.

## 2025-02-11 ENCOUNTER — HOSPITAL ENCOUNTER (OUTPATIENT)
Dept: RADIOLOGY | Facility: OTHER | Age: 35
Discharge: HOME | End: 2025-02-11
Attending: NURSE PRACTITIONER
Payer: COMMERCIAL

## 2025-02-11 ENCOUNTER — OFFICE VISIT (OUTPATIENT)
Dept: NEUROLOGY | Facility: CLINIC | Age: 35
End: 2025-02-11
Payer: COMMERCIAL

## 2025-02-11 VITALS — BODY MASS INDEX: 20.97 KG/M2 | HEIGHT: 71 IN | WEIGHT: 149.81 LBS

## 2025-02-11 DIAGNOSIS — R56.9 SEIZURE: Primary | ICD-10-CM

## 2025-02-11 DIAGNOSIS — M54.6 PAIN IN THORACIC SPINE: ICD-10-CM

## 2025-02-11 DIAGNOSIS — S22.000A COMPRESSION FRACTURE OF BODY OF THORACIC VERTEBRA: ICD-10-CM

## 2025-02-11 PROCEDURE — 99205 OFFICE O/P NEW HI 60 MIN: CPT | Mod: S$GLB,,, | Performed by: PSYCHIATRY & NEUROLOGY

## 2025-02-11 PROCEDURE — 99999 PR PBB SHADOW E&M-EST. PATIENT-LVL III: CPT | Mod: PBBFAC,,, | Performed by: PSYCHIATRY & NEUROLOGY

## 2025-02-11 PROCEDURE — 1159F MED LIST DOCD IN RCRD: CPT | Mod: CPTII,S$GLB,, | Performed by: PSYCHIATRY & NEUROLOGY

## 2025-02-11 PROCEDURE — 3008F BODY MASS INDEX DOCD: CPT | Mod: CPTII,S$GLB,, | Performed by: PSYCHIATRY & NEUROLOGY

## 2025-02-11 PROCEDURE — 72146 MRI CHEST SPINE W/O DYE: CPT | Mod: TC

## 2025-02-11 PROCEDURE — 1160F RVW MEDS BY RX/DR IN RCRD: CPT | Mod: CPTII,S$GLB,, | Performed by: PSYCHIATRY & NEUROLOGY

## 2025-02-11 PROCEDURE — 72146 MRI CHEST SPINE W/O DYE: CPT | Mod: 26,,, | Performed by: RADIOLOGY

## 2025-02-11 RX ORDER — DIVALPROEX SODIUM 500 MG/1
TABLET, FILM COATED, EXTENDED RELEASE ORAL
Qty: 180 TABLET | Refills: 3 | Status: SHIPPED | OUTPATIENT
Start: 2025-02-11
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
## 2025-02-12 ENCOUNTER — TELEPHONE (OUTPATIENT)
Dept: NEUROSURGERY | Facility: CLINIC | Age: 35
End: 2025-02-12
Payer: COMMERCIAL
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
## 2025-02-12 DIAGNOSIS — S22.000A COMPRESSION FRACTURE OF BODY OF THORACIC VERTEBRA: Primary | ICD-10-CM

## 2025-02-13 ENCOUNTER — OFFICE VISIT (OUTPATIENT)
Dept: ORTHOPEDICS | Facility: CLINIC | Age: 35
End: 2025-02-13
Payer: COMMERCIAL

## 2025-02-13 VITALS — HEIGHT: 71 IN | BODY MASS INDEX: 20.96 KG/M2 | WEIGHT: 149.69 LBS

## 2025-02-13 DIAGNOSIS — S22.000D COMPRESSION FRACTURE OF THORACIC VERTEBRA WITH ROUTINE HEALING, UNSPECIFIED THORACIC VERTEBRAL LEVEL, SUBSEQUENT ENCOUNTER: Primary | ICD-10-CM

## 2025-02-13 PROCEDURE — 3008F BODY MASS INDEX DOCD: CPT | Mod: CPTII,S$GLB,, | Performed by: ORTHOPAEDIC SURGERY

## 2025-02-13 PROCEDURE — 99999 PR PBB SHADOW E&M-EST. PATIENT-LVL III: CPT | Mod: PBBFAC,,, | Performed by: ORTHOPAEDIC SURGERY

## 2025-02-13 PROCEDURE — 1159F MED LIST DOCD IN RCRD: CPT | Mod: CPTII,S$GLB,, | Performed by: ORTHOPAEDIC SURGERY

## 2025-02-13 PROCEDURE — 99204 OFFICE O/P NEW MOD 45 MIN: CPT | Mod: S$GLB,,, | Performed by: ORTHOPAEDIC SURGERY

## 2025-02-14 ENCOUNTER — E-CONSULT (OUTPATIENT)
Dept: ENDOCRINOLOGY | Facility: CLINIC | Age: 35
End: 2025-02-14
Payer: COMMERCIAL

## 2025-02-14 ENCOUNTER — PATIENT MESSAGE (OUTPATIENT)
Dept: ORTHOPEDICS | Facility: CLINIC | Age: 35
End: 2025-02-14
Payer: COMMERCIAL

## 2025-02-14 ENCOUNTER — LAB VISIT (OUTPATIENT)
Dept: LAB | Facility: HOSPITAL | Age: 35
End: 2025-02-14
Attending: ORTHOPAEDIC SURGERY
Payer: COMMERCIAL

## 2025-02-14 DIAGNOSIS — M81.0 OSTEOPOROSIS, UNSPECIFIED OSTEOPOROSIS TYPE, UNSPECIFIED PATHOLOGICAL FRACTURE PRESENCE: Primary | ICD-10-CM

## 2025-02-14 DIAGNOSIS — S22.000D COMPRESSION FRACTURE OF THORACIC VERTEBRA WITH ROUTINE HEALING, UNSPECIFIED THORACIC VERTEBRAL LEVEL, SUBSEQUENT ENCOUNTER: ICD-10-CM

## 2025-02-14 LAB
ALBUMIN SERPL BCP-MCNC: 4.3 G/DL (ref 3.5–5.2)
ALP SERPL-CCNC: 137 U/L (ref 40–150)
ALT SERPL W/O P-5'-P-CCNC: 17 U/L (ref 10–44)
ANION GAP SERPL CALC-SCNC: 9 MMOL/L (ref 8–16)
AST SERPL-CCNC: 23 U/L (ref 10–40)
BASOPHILS # BLD AUTO: 0.03 K/UL (ref 0–0.2)
BASOPHILS NFR BLD: 0.5 % (ref 0–1.9)
BILIRUB SERPL-MCNC: 1.1 MG/DL (ref 0.1–1)
BUN SERPL-MCNC: 13 MG/DL (ref 6–20)
CALCIUM SERPL-MCNC: 9.8 MG/DL (ref 8.7–10.5)
CALCIUM SERPL-MCNC: 9.8 MG/DL (ref 8.7–10.5)
CHLORIDE SERPL-SCNC: 107 MMOL/L (ref 95–110)
CO2 SERPL-SCNC: 22 MMOL/L (ref 23–29)
CREAT SERPL-MCNC: 0.7 MG/DL (ref 0.5–1.4)
DIFFERENTIAL METHOD BLD: ABNORMAL
EOSINOPHIL # BLD AUTO: 0 K/UL (ref 0–0.5)
EOSINOPHIL NFR BLD: 0.7 % (ref 0–8)
ERYTHROCYTE [DISTWIDTH] IN BLOOD BY AUTOMATED COUNT: 11.6 % (ref 11.5–14.5)
EST. GFR  (NO RACE VARIABLE): >60 ML/MIN/1.73 M^2
GLUCOSE SERPL-MCNC: 91 MG/DL (ref 70–110)
HCT VFR BLD AUTO: 44.7 % (ref 40–54)
HGB BLD-MCNC: 15.4 G/DL (ref 14–18)
IMM GRANULOCYTES # BLD AUTO: 0.05 K/UL (ref 0–0.04)
IMM GRANULOCYTES NFR BLD AUTO: 0.9 % (ref 0–0.5)
LYMPHOCYTES # BLD AUTO: 2 K/UL (ref 1–4.8)
LYMPHOCYTES NFR BLD: 35.6 % (ref 18–48)
MCH RBC QN AUTO: 31.4 PG (ref 27–31)
MCHC RBC AUTO-ENTMCNC: 34.5 G/DL (ref 32–36)
MCV RBC AUTO: 91 FL (ref 82–98)
MONOCYTES # BLD AUTO: 0.5 K/UL (ref 0.3–1)
MONOCYTES NFR BLD: 9.1 % (ref 4–15)
NEUTROPHILS # BLD AUTO: 2.9 K/UL (ref 1.8–7.7)
NEUTROPHILS NFR BLD: 53.2 % (ref 38–73)
NRBC BLD-RTO: 0 /100 WBC
PLATELET # BLD AUTO: 248 K/UL (ref 150–450)
PMV BLD AUTO: 10.3 FL (ref 9.2–12.9)
POTASSIUM SERPL-SCNC: 4.2 MMOL/L (ref 3.5–5.1)
PROT SERPL-MCNC: 7.4 G/DL (ref 6–8.4)
PTH-INTACT SERPL-MCNC: 32.2 PG/ML (ref 9–77)
RBC # BLD AUTO: 4.91 M/UL (ref 4.6–6.2)
SODIUM SERPL-SCNC: 138 MMOL/L (ref 136–145)
TESTOST SERPL-MCNC: 600 NG/DL (ref 304–1227)
TSH SERPL DL<=0.005 MIU/L-ACNC: 1.23 UIU/ML (ref 0.4–4)
WBC # BLD AUTO: 5.47 K/UL (ref 3.9–12.7)

## 2025-02-14 PROCEDURE — 84075 ASSAY ALKALINE PHOSPHATASE: CPT | Performed by: ORTHOPAEDIC SURGERY

## 2025-02-14 PROCEDURE — 82652 VIT D 1 25-DIHYDROXY: CPT | Performed by: ORTHOPAEDIC SURGERY

## 2025-02-14 PROCEDURE — 85025 COMPLETE CBC W/AUTO DIFF WBC: CPT | Performed by: ORTHOPAEDIC SURGERY

## 2025-02-14 PROCEDURE — 84443 ASSAY THYROID STIM HORMONE: CPT | Performed by: ORTHOPAEDIC SURGERY

## 2025-02-14 PROCEDURE — 84403 ASSAY OF TOTAL TESTOSTERONE: CPT | Performed by: ORTHOPAEDIC SURGERY

## 2025-02-14 PROCEDURE — 80053 COMPREHEN METABOLIC PANEL: CPT | Performed by: ORTHOPAEDIC SURGERY

## 2025-02-14 PROCEDURE — 83970 ASSAY OF PARATHORMONE: CPT | Performed by: ORTHOPAEDIC SURGERY

## 2025-02-14 NOTE — CONSULTS
[facility]  Response for E-Consult     Patient Name: Miguel Farmer  MRN: [id number]  Primary Care Provider: Bon Coyle MD   Requesting Provider: Ivanna Lane.,*  E-Consult to Endocrinology  Consult performed by: Roddy Chavira DO  Consult ordered by: Ivanna Lane, PA-C  Reason for consult: Osteoporosis  Assessment/Recommendations: See note      I have reviewed the chart and recent bone density scan for this patient, which confirms osteoporosis with T-scores less than -3 in the lumbar spine and osteopenia at the hip. Given the recent thoracic compression fracture after a possible epileptic event, we recommend a secondary workup for osteoporosis to assess for underlying causes before therapy decision can be made.    Suggested testing includes:  TSH  PTH  Serum calcium  25-hydroxy vitamin D  Testosterone  24-hour urine calcium  CBC  CMP  Alkaline phosphatase    At this time, we recommend referral to endocrinology for further evaluation and treatment planning. No specific medical therapy is advised until the full assessment is completed.    Let us know if you have any questions.      Total time of Consultation: 10 minute    I did not speak to the requesting provider verbally about this.     *This eConsult is based on the clinical data available to me and is furnished without benefit of a physical examination. The eConsult will need to be interpreted in light of any clinical issues or changes in patient status not available to me at the time of filing this eConsults. Significant changes in patient condition or level of acuity should result in immediate formal consultation and reevaluation. Please alert me if you have further questions.    Thank you for this eConsult referral.     DO [facility]

## 2025-02-18 LAB — 1,25(OH)2D3 SERPL-MCNC: 40 PG/ML (ref 20–79)

## 2025-02-19 LAB — ALP BONE SERPL-MCNC: 32.5 UG/L (ref 7.7–21.3)

## 2025-02-20 NOTE — PROGRESS NOTES
Subjective:      Patient ID: Miguel Farmer is a 34 y.o. male.    Chief Complaint:  Osteoporosis    History of Present Illness  Miguel Farmer is here for evaluation of osteoporosis based on T score and recent thoracic compression fractures on Depakote since around 17 years old.  Believes fractures happened during epileptic seizure.  Last seizure ~10 years ago.  This is their first visit with me.      With regards to osteoporosis:     BMD 2/13/2025:   COMPARISON:  None     FINDINGS:  The L1 to L4 vertebral bone mineral density is equal to 0.743 g/cm squared with a T score of -3.2.  The left femoral neck bone mineral density is equal to 0.641 g/cm squared with a T score of -2.1.  The right femoral neck bone mineral density is equal to 0.606 g/cm squared with a T score of -2.4.  There is a 6.1% risk of a major osteoporotic fracture and a 2.3% risk of hip fracture in the next 10 years (FRAX).     Impression:  Osteoporosis of the lumbar spine (L1-L4), and osteopenia of the femoral necks.    Calcium intake: 600 mg BID  Vit D intake: Vitamin D3 800iu BID    Weight bearing exercise: Active walks dog daily  Falls:  None.  Possibly during epileptic episode but unknown.  Fractures: T3, T7, T8 and T9   Significant height loss (>2 inches): Denies    Family history: Denies    Tobacco Use: Used to vape, but quit 5 moths ago  Alcohol Use: Socially  Glucocorticoid History: Denies  Anticoagulant Use: Denies  GERD/PPI Use: Denies  History of Malabsorption: Denies  Antiseizure Medications: Depakote 500 mg   History of Thyroid Disease: Denies  Kidney Disease: Denies  Personal history of kidney stones: Denies  Family history of kidney stones: Denies  MI: Denies  Stroke: Denies  Dental Visit: Denies  Cancer Hx:  Denies  Radiation Treatment:  Denies    Reports point tenderness along spine  Denies bilateral hip tenderness  Gait Disturbances/Ambulates Independently:  Independent    Lab Results   Component Value Date    CALCIUM 9.8  02/14/2025    CALCIUM 9.8 02/14/2025     Vit D, 25-Hydroxy   Date Value Ref Range Status   02/21/2025 28 (L) 30 - 96 ng/mL Final     Comment:     Vitamin D deficiency.........<10 ng/mL                              Vitamin D insufficiency......10-29 ng/mL       Vitamin D sufficiency........> or equal to 30 ng/mL  Vitamin D toxicity............>100 ng/mL         Review of Systems - As Above    Objective:   Physical Exam  Constitutional:       Appearance: Normal appearance.   Neurological:      Mental Status: He is alert and oriented to person, place, and time.         There were no vitals taken for this visit.    There is no height or weight on file to calculate BMI.    Lab Review:   Lab Results   Component Value Date    HGBA1C 4.7 08/08/2019       Lab Results   Component Value Date    CHOL 160 08/08/2019    HDL 52 08/08/2019    LDLCALC 91.6 08/08/2019    TRIG 82 08/08/2019    CHOLHDL 32.5 08/08/2019     Lab Results   Component Value Date     02/14/2025    K 4.2 02/14/2025     02/14/2025    CO2 22 (L) 02/14/2025    GLU 91 02/14/2025    BUN 13 02/14/2025    CREATININE 0.7 02/14/2025    CALCIUM 9.8 02/14/2025    CALCIUM 9.8 02/14/2025    PROT 7.4 02/14/2025    ALBUMIN 4.3 02/14/2025    BILITOT 1.1 (H) 02/14/2025    ALKPHOS 137 02/14/2025    AST 23 02/14/2025    ALT 17 02/14/2025    ANIONGAP 9 02/14/2025    ESTGFRAFRICA >60.0 08/08/2019    EGFRNONAA >60.0 08/08/2019    TSH 1.230 02/14/2025     Vit D, 25-Hydroxy   Date Value Ref Range Status   02/21/2025 28 (L) 30 - 96 ng/mL Final     Comment:     Vitamin D deficiency.........<10 ng/mL                              Vitamin D insufficiency......10-29 ng/mL       Vitamin D sufficiency........> or equal to 30 ng/mL  Vitamin D toxicity............>100 ng/mL       Assessment and Plan     1. Osteoporosis with current pathological fracture, unspecified osteoporosis type, initial encounter  Creatinine, urine, timed 24 Hours    Calcium, Timed Urine [facility]; 24 Hours       2. Compression fracture of thoracic vertebra with routine healing, unspecified thoracic vertebral level, subsequent encounter  Vitamin D    Ambulatory referral/consult to Endocrinology    Celiac Disease Panel    CANCELED: Calcium, Timed Urine [facility]; 24 Hours    CANCELED: Creatinine, urine, timed 24 Hours          Osteoporosis with current pathological fracture  Risk factors include long history of Depakote use  Reviewed basics of quantity versus quality  Plan work up of accelerated bone loss complete excluding Vitamin D and 24 hour urine/calcium.  Ordered today to complete secondary evaluation prior to initiation of treatment.  RDA of calcium and Vitamin D discussed  Encouraged to continue weight exercises encouraged    Treatment options and potential side effects discussed Forteo and Tymlos.  Treatment: Due to severe osteoporosis based on T-score less than -3.0 and current spinal fragility fractures recommend either Forteo or Tymlos for 18 to 24 months followed by 1 dose of Reclast and BMD Q2 years.  Alerted that if dental work needs to be done it should be done prior to initiating therapy. Dental health: UTD  Repeat DEXA scan 2/2027      Compression fracture of thoracic vertebra with routine healing  See Above    Lisa Lier, FNP-C [facility]     This is a MyChart video visit.    The patient location is: [place]  The chief complaint leading to consultation is: Osteoporosis  Visit type: Virtual visit with synchronous audio and video  Total time spent with patient: 30  Each patient to whom he or she provides medical services by telemedicine is:  (1) informed of the relationship between the physician and patient and the respective role of any other health care provider with respect to management of the patient; and (2) notified that he or she may decline to receive medical services by telemedicine and may withdraw from such care at any time.    Case discussed with Dr. Pollock  Recommendations were discussed  with the patient in detail  The patient verbalized understanding and agrees with the plan outlined as above.     I spent 35 minutes face-to-face with the patient, over half of the visit was spent on counseling and/or coordinating the care of the patient.      Visit today included increased complexity associated with the care of the episodic problem osteoporosis addressed and managing the longitudinal care of the patient due to the serious and/or complex managed problem(s).      Counseling includes:  Diagnostic results, impressions, recommendations   Prognosis   Risk and benefits of management/treatment options   Instructions for management treatment and or follow-up   Importance of compliance with management   Risk factor reduction   Patient education      I have reviewed and concur with the NP's history, assessment, and plan.  I have personally interviewed the patient and all questions were answered.       Guille Pollock M.D. Staff Endocrinology

## 2025-02-21 ENCOUNTER — LAB VISIT (OUTPATIENT)
Dept: LAB | Facility: HOSPITAL | Age: 35
End: 2025-02-21
Attending: INTERNAL MEDICINE
Payer: COMMERCIAL

## 2025-02-21 ENCOUNTER — OFFICE VISIT (OUTPATIENT)
Dept: ENDOCRINOLOGY | Facility: CLINIC | Age: 35
End: 2025-02-21
Payer: COMMERCIAL

## 2025-02-21 ENCOUNTER — RESULTS FOLLOW-UP (OUTPATIENT)
Dept: ENDOCRINOLOGY | Facility: CLINIC | Age: 35
End: 2025-02-21

## 2025-02-21 DIAGNOSIS — S22.000D COMPRESSION FRACTURE OF THORACIC VERTEBRA WITH ROUTINE HEALING, UNSPECIFIED THORACIC VERTEBRAL LEVEL, SUBSEQUENT ENCOUNTER: ICD-10-CM

## 2025-02-21 DIAGNOSIS — M80.00XA OSTEOPOROSIS WITH CURRENT PATHOLOGICAL FRACTURE, UNSPECIFIED OSTEOPOROSIS TYPE, INITIAL ENCOUNTER: Primary | ICD-10-CM

## 2025-02-21 LAB — 25(OH)D3+25(OH)D2 SERPL-MCNC: 28 NG/ML (ref 30–96)

## 2025-02-21 PROCEDURE — 82306 VITAMIN D 25 HYDROXY: CPT

## 2025-02-21 PROCEDURE — 86258 DGP ANTIBODY EACH IG CLASS: CPT

## 2025-02-21 PROCEDURE — 36415 COLL VENOUS BLD VENIPUNCTURE: CPT | Mod: PO

## 2025-02-21 NOTE — PATIENT INSTRUCTIONS
"Osteoporosis medications  These are the medications we discussed for osteoporosis treatment:    - Forteo (teriparatide) or Tymlos (abaloparatide):           - medications that "build bone" or increases bone formation           - subcutaneous injection (under the skin) taken once daily that you self-administer at home for 18-24 months    For more information on medications:  https://www.nof.org/patients/treatment/medicationadherence/   Home - [facility] [employer] [facility]opor[employer] [facility] (www.bonehealthandosteoporosis.org)    [facility]:  [phone]        Side effects of Forteo were reviewed, including flushing, palpitations, bone/muscle pain and osteosarcoma when high doses given to rats but post-marketing surveillance in humans has not shown an increased risk of osteosarcoma above baseline risk.    Denied bone metastases/skeletal malignancies, prior external beam or implant radiation therapy involving the skeleton, cancer, renal insufficiency, bone disorder other than osteoporosis, primary/secondary hyperparathyroidism, orthostatic hypotension, kidney stones, history of gout, unexplained alkaline phosphatase elevation, Paget's disease, known hypercalcemia/hypercalciuria.        HOW TO COLLECT AN ACCURATE   24 HOUR URINE SPECIMEN     I want you to collect EVERY drop of your urine during a 24 hour period. I do not care what the volume of the urine is as long as it represents every drop that you pass during that 24 hour period. If you need to have a bowel movement, you may separate the urine but I want every drop.     Begin the collection on a morning which is convenient for you. At that time, pass your urine, flush it down the toilet and note the exact time. Now you have an empty bladder and empty bottle. That starts the collection. Collect every drop all during the day and night until exactly the same time the next morning, when you should pass your urine and add it to the bottle.     Bring the closed container " back to the same laboratory that issued the empty container.

## 2025-02-21 NOTE — Clinical Note
Return to clinic in 1 year Patient to complete labs at mid-[facility] location no scheduling needed.

## 2025-02-21 NOTE — ASSESSMENT & PLAN NOTE
Risk factors include long history of Depakote use  Reviewed basics of quantity versus quality  Plan work up of accelerated bone loss complete excluding Vitamin D and 24 hour urine/calcium.  Ordered today to complete secondary evaluation prior to initiation of treatment.  RDA of calcium and Vitamin D discussed  Encouraged to continue weight exercises encouraged    Treatment options and potential side effects discussed Forteo and Tymlos.  Treatment: Due to severe osteoporosis based on T-score less than -3.0 and current spinal fragility fractures recommend either Forteo or Tymlos for 18 to 24 months followed by 1 dose of Reclast and BMD Q2 years.  Alerted that if dental work needs to be done it should be done prior to initiating therapy. Dental health: UTD  Repeat DEXA scan 2/2027

## 2025-02-22 ENCOUNTER — LAB VISIT (OUTPATIENT)
Dept: LAB | Facility: HOSPITAL | Age: 35
End: 2025-02-22
Attending: INTERNAL MEDICINE
Payer: COMMERCIAL

## 2025-02-22 DIAGNOSIS — M80.00XA OSTEOPOROSIS WITH CURRENT PATHOLOGICAL FRACTURE, UNSPECIFIED OSTEOPOROSIS TYPE, INITIAL ENCOUNTER: ICD-10-CM

## 2025-02-22 LAB
CALCIUM 24H UR-MRATE: 6 MG/HR (ref 4–12)
CALCIUM UR-MCNC: 8.3 MG/DL (ref 0–15)
CALCIUM URINE (MG/SPEC): 149 MG/SPEC
CREAT 24H UR-MRATE: 59.3 MG/HR (ref 40–75)
CREAT UR-MCNC: 79 MG/DL (ref 23–375)
CREATININE, URINE (MG/SPEC): 1422 MG/SPEC
URINE COLLECTION DURATION: 24 HR
URINE COLLECTION DURATION: 24 HR
URINE VOLUME: 1800 ML
URINE VOLUME: 1800 ML

## 2025-02-22 PROCEDURE — 82570 ASSAY OF URINE CREATININE: CPT | Performed by: INTERNAL MEDICINE

## 2025-02-22 PROCEDURE — 82340 ASSAY OF CALCIUM IN URINE: CPT | Performed by: INTERNAL MEDICINE

## 2025-02-24 ENCOUNTER — HOSPITAL ENCOUNTER (OUTPATIENT)
Dept: RADIOLOGY | Facility: OTHER | Age: 35
Discharge: HOME | End: 2025-02-24
Attending: PSYCHIATRY & NEUROLOGY
Payer: COMMERCIAL

## 2025-02-24 DIAGNOSIS — R56.9 SEIZURE: ICD-10-CM

## 2025-02-24 PROCEDURE — 70551 MRI BRAIN STEM W/O DYE: CPT | Mod: TC

## 2025-02-24 PROCEDURE — 70551 MRI BRAIN STEM W/O DYE: CPT | Mod: 26,,, | Performed by: RADIOLOGY

## 2025-02-25 LAB
GLIADIN PEPTIDE IGA SER-ACNC: 1.7 U/ML
GLIADIN PEPTIDE IGG SER-ACNC: <0.6 U/ML
IGA SERPL-MCNC: 302 MG/DL (ref 70–400)
TTG IGA SER-ACNC: 1 U/ML
TTG IGG SER-ACNC: 0.9 U/ML

## 2025-02-25 RX ORDER — ABALOPARATIDE 2000 UG/ML
80 INJECTION, SOLUTION SUBCUTANEOUS DAILY
Qty: 1.56 ML | Refills: 11 | Status: ACTIVE | OUTPATIENT
Start: 2025-02-25

## 2025-02-25 RX ORDER — PEN NEEDLE, DIABETIC 31 GX5/16"
NEEDLE, DISPOSABLE MISCELLANEOUS
Qty: 90 EACH | Refills: 3 | Status: SHIPPED | OUTPATIENT
Start: 2025-02-25

## 2025-03-06 ENCOUNTER — HOSPITAL ENCOUNTER (OUTPATIENT)
Dept: RADIOLOGY | Facility: HOSPITAL | Age: 35
Discharge: HOME | End: 2025-03-06
Attending: ORTHOPAEDIC SURGERY
Payer: COMMERCIAL

## 2025-03-06 DIAGNOSIS — S22.000D COMPRESSION FRACTURE OF THORACIC VERTEBRA WITH ROUTINE HEALING, UNSPECIFIED THORACIC VERTEBRAL LEVEL, SUBSEQUENT ENCOUNTER: ICD-10-CM

## 2025-03-06 PROCEDURE — 72070 X-RAY EXAM THORAC SPINE 2VWS: CPT | Mod: TC

## 2025-03-06 PROCEDURE — 72070 X-RAY EXAM THORAC SPINE 2VWS: CPT | Mod: 26,,, | Performed by: RADIOLOGY

## 2025-06-23 ENCOUNTER — OFFICE VISIT (OUTPATIENT)
Dept: PRIMARY CARE CLINIC | Facility: CLINIC | Age: 35
End: 2025-06-23
Payer: COMMERCIAL

## 2025-06-23 ENCOUNTER — RESULTS FOLLOW-UP (OUTPATIENT)
Dept: PRIMARY CARE CLINIC | Facility: CLINIC | Age: 35
End: 2025-06-23

## 2025-06-23 VITALS
SYSTOLIC BLOOD PRESSURE: 110 MMHG | WEIGHT: 149.5 LBS | HEART RATE: 77 BPM | RESPIRATION RATE: 18 BRPM | BODY MASS INDEX: 20.93 KG/M2 | DIASTOLIC BLOOD PRESSURE: 62 MMHG | OXYGEN SATURATION: 98 % | HEIGHT: 71 IN

## 2025-06-23 DIAGNOSIS — M54.50 ACUTE MIDLINE LOW BACK PAIN WITHOUT SCIATICA: ICD-10-CM

## 2025-06-23 DIAGNOSIS — Z00.00 ANNUAL PHYSICAL EXAM: Primary | ICD-10-CM

## 2025-06-23 DIAGNOSIS — M81.0 OSTEOPOROSIS, UNSPECIFIED OSTEOPOROSIS TYPE, UNSPECIFIED PATHOLOGICAL FRACTURE PRESENCE: ICD-10-CM

## 2025-06-23 DIAGNOSIS — Z51.81 MEDICATION MONITORING ENCOUNTER: ICD-10-CM

## 2025-06-23 PROCEDURE — 1160F RVW MEDS BY RX/DR IN RCRD: CPT | Mod: CPTII,S$GLB,, | Performed by: STUDENT IN AN ORGANIZED HEALTH CARE EDUCATION/TRAINING PROGRAM

## 2025-06-23 PROCEDURE — 1159F MED LIST DOCD IN RCRD: CPT | Mod: CPTII,S$GLB,, | Performed by: STUDENT IN AN ORGANIZED HEALTH CARE EDUCATION/TRAINING PROGRAM

## 2025-06-23 PROCEDURE — 3008F BODY MASS INDEX DOCD: CPT | Mod: CPTII,S$GLB,, | Performed by: STUDENT IN AN ORGANIZED HEALTH CARE EDUCATION/TRAINING PROGRAM

## 2025-06-23 PROCEDURE — 3078F DIAST BP <80 MM HG: CPT | Mod: CPTII,S$GLB,, | Performed by: STUDENT IN AN ORGANIZED HEALTH CARE EDUCATION/TRAINING PROGRAM

## 2025-06-23 PROCEDURE — 3074F SYST BP LT 130 MM HG: CPT | Mod: CPTII,S$GLB,, | Performed by: STUDENT IN AN ORGANIZED HEALTH CARE EDUCATION/TRAINING PROGRAM

## 2025-06-23 PROCEDURE — 99395 PREV VISIT EST AGE 18-39: CPT | Mod: S$GLB,,, | Performed by: STUDENT IN AN ORGANIZED HEALTH CARE EDUCATION/TRAINING PROGRAM

## 2025-06-23 PROCEDURE — 99999 PR PBB SHADOW E&M-EST. PATIENT-LVL IV: CPT | Mod: PBBFAC,,, | Performed by: STUDENT IN AN ORGANIZED HEALTH CARE EDUCATION/TRAINING PROGRAM

## 2025-06-23 NOTE — PROGRESS NOTES
"Subjective:       Patient ID: Miguel Farmer is a 35 y.o. male.    Chief Complaint: Labs Only and Back Pain      HPI:  35 y.o. male presents to [facility] here with request for labs and back pain.    Patient reports thoracic back pain that stemmed from seizure in February. Has some burning pain in lower back. Is in PT. Imaging revealed multiple thoracic compression fractures. Has seen Ortho spine since and managing conservatively. Does have some concerns for any lower back involvement      Low back pain began when trying to return to the driving range. Mild kyphosis. Began last couple weeks lower back pain and numbness  Onset?: ~2 weeks ago  Progression?: Persistent, constant low dull  Inciting incident/new physical activity?: Driving range  Past trauma/surgery?: Seizure with compression fracture 2/2025  Recurrent?: No  Description?: Midline, dull ache  Radiates?: No  Severity?: 6/10 at worst, more of an aggravation  Worsening factors?: Long walk or stand  Interventions?: No ice/heat or medications  Did interventions help?: N/A      Review of Systems   Constitutional:  Negative for chills, diaphoresis, fatigue and fever.   Respiratory:  Negative for chest tightness and shortness of breath.    Cardiovascular:  Negative for chest pain and palpitations.   Gastrointestinal:  Negative for abdominal pain, diarrhea, nausea and vomiting.   Musculoskeletal:  Positive for back pain. Negative for joint swelling and myalgias.   Skin:  Negative for rash and wound.   Neurological:  Negative for dizziness and weakness.       Objective:      Vitals:    06/23/25 1459   BP: 110/62   BP Location: Left arm   Patient Position: Sitting   Pulse: 77   Resp: 18   SpO2: 98%   Weight: 67.8 kg (149 lb 7.6 oz)   Height: 5' 11" (1.803 m)     Physical Exam  Vitals reviewed.   Constitutional:       General: He is not in acute distress.     Appearance: Normal appearance. He is not ill-appearing.   HENT:      Head: Normocephalic and atraumatic. " "  Eyes:      General:         Right eye: No discharge.         Left eye: No discharge.      Conjunctiva/sclera: Conjunctivae normal.   Cardiovascular:      Rate and Rhythm: Normal rate and regular rhythm.      Pulses: Normal pulses.      Heart sounds: No murmur heard.  Pulmonary:      Effort: Pulmonary effort is normal.      Breath sounds: Normal breath sounds.   Musculoskeletal:         General: No deformity.      Cervical back: Neck supple. No rigidity.      Lumbar back: Tenderness (mild to upperlumbar/inferior thoracic region) present. No swelling, edema, deformity, signs of trauma, lacerations, spasms or bony tenderness. Normal range of motion (Mild pain with forward flexion and right lateral flexion). No scoliosis.   Lymphadenopathy:      Cervical: No cervical adenopathy.   Skin:     General: Skin is warm and dry.      Coloration: Skin is not jaundiced.   Neurological:      General: No focal deficit present.      Mental Status: He is alert and oriented to person, place, and time.   Psychiatric:         Mood and Affect: Mood normal.         Behavior: Behavior normal.             Lab Results   Component Value Date     02/14/2025    K 4.2 02/14/2025     02/14/2025    CO2 22 (L) 02/14/2025    BUN 13 02/14/2025    CREATININE 0.7 02/14/2025    ANIONGAP 9 02/14/2025     Lab Results   Component Value Date    HGBA1C 4.7 08/08/2019     No results found for: "BNP", "BNPTRIAGEBLO"    Lab Results   Component Value Date    WBC 5.47 02/14/2025    HGB 15.4 02/14/2025    HCT 44.7 02/14/2025     02/14/2025    GRAN 2.9 02/14/2025    GRAN 53.2 02/14/2025     Lab Results   Component Value Date    CHOL 160 08/08/2019    HDL 52 08/08/2019    LDLCALC 91.6 08/08/2019    TRIG 82 08/08/2019        Current Medications[1]        Assessment:       1. Annual physical exam    2. Acute bilateral low back pain without sciatica    3. Osteoporosis, unspecified osteoporosis type, unspecified pathological fracture presence    4. " "Medication monitoring encounter           Plan:       Annual physical exam  Acute midline low back pain without sciatica  -     X-Ray Lumbar Spine 5 View; Future; Expected date: 06/23/2025  - RICE therapy recommended  - Declines request for referral at this time or further PT evaluation, will reach out if desires further intervention    Osteoporosis, unspecified osteoporosis type, unspecified pathological fracture presence  -     Vitamin D; Future; Expected date: 06/23/2025    Medication monitoring encounter  -     Comprehensive Metabolic Panel; Future; Expected date: 06/23/2025  -     CBC Auto Differential; Future; Expected date: 06/23/2025    RTC in 1 year         [1]   Current Outpatient Medications:     abaloparatide (TYMLOS) 80 mcg (3,120 mcg/1.56 mL) PnIj, Inject 0.04 mLs (80 mcg total) into the skin once daily., Disp: 1.56 mL, Rfl: 11    BD ULTRA-FINE CHALINO PEN NEEDLE 32 gauge x 5/32" Ndle, For use daily with Tymlos, Disp: 90 each, Rfl: 3    divalproex ER (DEPAKOTE ER) 500 MG Tb24 24 hr tablet, Take 1 tablet by mouth twice daily, Disp: 180 tablet, Rfl: 3    "